# Patient Record
Sex: FEMALE | Race: BLACK OR AFRICAN AMERICAN | NOT HISPANIC OR LATINO | Employment: FULL TIME | URBAN - METROPOLITAN AREA
[De-identification: names, ages, dates, MRNs, and addresses within clinical notes are randomized per-mention and may not be internally consistent; named-entity substitution may affect disease eponyms.]

---

## 2017-02-10 ENCOUNTER — HOSPITAL ENCOUNTER (EMERGENCY)
Facility: CLINIC | Age: 27
Discharge: HOME | End: 2017-02-11
Attending: EMERGENCY MEDICINE | Admitting: EMERGENCY MEDICINE
Payer: COMMERCIAL

## 2017-02-10 DIAGNOSIS — N93.9 VAGINAL BLEEDING: ICD-10-CM

## 2017-02-10 PROCEDURE — 99284 EMERGENCY DEPT VISIT MOD MDM: CPT | Mod: 25

## 2017-02-10 NOTE — ED AVS SNAPSHOT
Emergency Department    [address] [facility] [place] [address]    Phone:  [phone]    Fax:  [phone]                                       Malini Stanley   MRN: [id number]    Department:   Emergency Department   Date of Visit:  2/10/2017           After Visit Summary Signature Page     I have received my discharge instructions, and my questions have been answered. I have discussed any challenges I see with this plan with the nurse or doctor.    ..........................................................................................................................................  Patient/Patient Representative Signature      ..........................................................................................................................................  Patient Representative Print Name and Relationship to Patient    ..................................................               ................................................  Date                                            Time    ..........................................................................................................................................  Reviewed by Signature/Title    ...................................................              ..............................................  Date                                                            Time

## 2017-02-10 NOTE — ED AVS SNAPSHOT
Emergency Department    [address] [facility] [place] [address]    Phone:  [phone]    Fax:  [phone]                                       Malini Stanley   MRN: [id number]    Department:   Emergency Department   Date of Visit:  2/10/2017           Patient Information     Date Of Birth          1990        Your diagnoses for this visit were:     Vaginal bleeding        You were seen by Nils Pires MD.      Follow-up Information     Follow up with Clinic, [facility]. Schedule an appointment as soon as possible for a visit in 1 week.    Contact information:    [employer], [address]  [facility]  [phone]          Follow up with  Emergency Department.    Specialty:  EMERGENCY MEDICINE    Why:  If symptoms worsen    Contact information:    [address] [facility] [address] [phone]        Discharge Instructions         Dysfunctional Uterine Bleeding    Dysfunctional uterine bleeding is a condition in which bleeding is abnormal and occurs at unexpected times of the month. This happens due to changes in the hormones that help control a woman s menstrual cycle each month.  The bleeding may be heavier or lighter than normal. If you have heavy bleeding often, this can lead to a problem called anemia. With anemia, your red blood cell count is too low. Red blood cells are needed because they help carry oxygen throughout your body. Severe anemia may cause you to look pale and feel very weak or tired. You might also become short of breath easily.  To treat dysfunctional uterine bleeding, medicines are often tried first. If these don t help, further testing and treatments may be needed. Discuss all of your options with your provider.  Home care  Medicines  If you re prescribed medicines, be sure to take them as directed. Some of the more common medicines you may be prescribed include:    Hormone therapy (Options include most methods of hormonal  birth control such as pills, shots, or a hormone-releasing IUD)    Nonsteroidal anti-inflammatory drugs (NSAIDs), such as ibuprofen    Iron supplements, if you have anemia     General care    Get plenty of rest if you tire easily. Avoid heavy exertion.    To help relieve pain or cramping that may occur with bleeding, try using a heating pad on the lower belly or back. A warm bath may also help.  Follow-up care  Follow up with your healthcare provider as directed.  When to seek medical advice  Call your healthcare provider right away if:    Bleeding becomes heavy (soaking 1 pad or tampon every hour for 3 hours)    Increased abdominal pain    Irregular bleeding worsens or does not get better even with treatment    Fever of 100.4 F (38 C) or higher, or as directed by your provider    Signs of anemia, such as pale skin, extreme fatigue or weakness, or shortness of breath    Dizziness or fainting       9866-6516 [employer]. [address] [place] [address]. All rights reserved. This information is not intended as a substitute for professional medical care. Always follow your healthcare professional's instructions.          24 Hour Appointment Hotline       To make an appointment at any [facility] (formerly [facility]3], call [phone] ([phone]). If you don't have a family doctor or clinic, we will help you find one. [facility] clinics are conveniently located to serve the needs of you and your family.             Review of your medicines      START taking        Dose / Directions Last dose taken    levonorgestrel-ethinyl estradiol 0.1-20 MG-MCG per tablet   Commonly known as:  DELBERT NOBLES LESSINA   Dose:  1 tablet   Quantity:  28 tablet        Take 1 tablet by mouth daily   Refills:  0        ondansetron 4 MG ODT tab   Commonly known as:  ZOFRAN ODT   Dose:  4 mg   Quantity:  10 tablet        Take 1 tablet (4 mg) by mouth every 8 hours as needed for nausea   Refills:  0                Prescriptions were  sent or printed at these locations (2 Prescriptions)                   Other Prescriptions                Printed at Department/Unit printer (2 of 2)         ondansetron (ZOFRAN ODT) 4 MG ODT tab               levonorgestrel-ethinyl estradiol (AVIANE,ALESSE,LESSINA) 0.1-20 MG-MCG per tablet                Procedures and tests performed during your visit     CBC with platelets + differential    Chlamydia trachomatis PCR    HCG QUALitative pregnancy (blood)    INR    Neisseria gonorrhoeae PCR    Orthostatic blood pressure and pulse    PTT    US Pelvic Complete with Transvaginal    Wet prep      Orders Needing Specimen Collection     None      Pending Results     Date and Time Order Name Status Description    2/11/2017 0103 US Pelvic Complete with Transvaginal Preliminary     2/11/2017 0009 Neisseria gonorrhoeae PCR In process     2/11/2017 0009 Chlamydia trachomatis PCR In process             Pending Culture Results     Date and Time Order Name Status Description    2/11/2017 0009 Neisseria gonorrhoeae PCR In process     2/11/2017 0009 Chlamydia trachomatis PCR In process        Test Results from your hospital stay           2/11/2017 12:42 AM - Interface, Flexilab Results      Component Results     Component Value Ref Range & Units Status    WBC 9.6 4.0 - 11.0 10e9/L Final    RBC Count 4.73 3.8 - 5.2 10e12/L Final    Hemoglobin 12.1 11.7 - 15.7 g/dL Final    Hematocrit 37.0 35.0 - 47.0 % Final    MCV 78 78 - 100 fl Final    MCH 25.6 (L) 26.5 - 33.0 pg Final    MCHC 32.7 31.5 - 36.5 g/dL Final    RDW 14.9 10.0 - 15.0 % Final    Platelet Count 300 150 - 450 10e9/L Final    Diff Method Automated Method  Final    % Neutrophils 66.3 % Final    % Lymphocytes 26.3 % Final    % Monocytes 5.4 % Final    % Eosinophils 1.4 % Final    % Basophils 0.3 % Final    % Immature Granulocytes 0.3 % Final    Absolute Neutrophil 6.4 1.6 - 8.3 10e9/L Final    Absolute Lymphocytes 2.5 0.8 - 5.3 10e9/L Final    Absolute Monocytes 0.5 0.0 -  1.3 10e9/L Final    Absolute Eosinophils 0.1 0.0 - 0.7 10e9/L Final    Absolute Basophils 0.0 0.0 - 0.2 10e9/L Final    Abs Immature Granulocytes 0.0 0 - 0.4 10e9/L Final         2/11/2017  1:01 AM - Interface, Flexilab Results      Component Results     Component Value Ref Range & Units Status    HCG Qualitative Serum Negative NEG Final         2/11/2017 12:53 AM - Interface, Flexilab Results      Component Results     Component Value Ref Range & Units Status    INR 1.10 0.86 - 1.14 Final         2/11/2017 12:53 AM - Interface, Flexilab Results      Component Results     Component Value Ref Range & Units Status    PTT 32 22 - 37 sec Final         2/11/2017  1:10 AM - Interface, Flexilab Results      Component Results     Component    Specimen Description    Cervix    Wet Prep    No Trichomonas seen  No yeast seen  No clue cells seen  No PMNs seen      Micro Report Status    FINAL 02/11/2017 2/11/2017  1:01 AM - Interface, Flexilab Results         2/11/2017  1:01 AM - Interface, Flexilab Results               2/11/2017  2:10 AM - Interface, Radiant Ib      Narrative     US PELVIC COMPLETE WITH TRANSVAGINAL  2/11/2017 1:48 AM      HISTORY: Vaginal bleeding.     COMPARISON: None.    FINDINGS: Transabdominal and transvaginal imaging was performed.  Transvaginal imaging was performed to better visualize the endometrium  and adnexa. The uterus is normal in size and position measuring 7.2 x  4.0 x 4.1 cm. The endometrium is normal in thickness at 0.9 cm. There  are several nabothian cysts in the cervix. The left ovary is normal in  size and appearance. The right ovary is not seen. No adnexal mass. No  free pelvic fluid.        Impression     IMPRESSION: Normal pelvis. The right ovary is not seen.                Clinical Quality Measure: Blood Pressure Screening     Your blood pressure was checked while you were in the emergency department today. The last reading we obtained was  BP: 117/55 mmHg . Please read the  "guidelines below about what these numbers mean and what you should do about them.  If your systolic blood pressure (the top number) is less than 120 and your diastolic blood pressure (the bottom number) is less than 80, then your blood pressure is normal. There is nothing more that you need to do about it.  If your systolic blood pressure (the top number) is 120-139 or your diastolic blood pressure (the bottom number) is 80-89, your blood pressure may be higher than it should be. You should have your blood pressure rechecked within a year by a primary care provider.  If your systolic blood pressure (the top number) is 140 or greater or your diastolic blood pressure (the bottom number) is 90 or greater, you may have high blood pressure. High blood pressure is treatable, but if left untreated over time it can put you at risk for heart attack, stroke, or kidney failure. You should have your blood pressure rechecked by a primary care provider within the next 4 weeks.  If your provider in the emergency department today gave you specific instructions to follow-up with your doctor or provider even sooner than that, you should follow that instruction and not wait for up to 4 weeks for your follow-up visit.        Thank you for choosing [facility]       Thank you for choosing [facility] for your care. Our goal is always to provide you with excellent care. Hearing back from our patients is one way we can continue to improve our services. Please take a few minutes to complete the written survey that you may receive in the mail after you visit with us. Thank you!        [employer] Information     [employer] lets you send messages to your doctor, view your test results, renew your prescriptions, schedule appointments and more. To sign up, go to [email or web address] . Click on \"Log in\" on the left side of the screen, which will take you to the Welcome page. Then click on \"Sign up Now\" on the right side of the page.     You will be " asked to enter the access code listed below, as well as some personal information. Please follow the directions to create your username and password.     Your access code is: [email or web address]  Expires: 2017  3:00 AM     Your access code will  in 90 days. If you need help or a new code, please call your [facility] clinic or [phone].        Care EveryWhere ID     This is your Care EveryWhere ID. This could be used by other organizations to access your [facility] medical records  [id number]        After Visit Summary       This is your record. Keep this with you and show to your community pharmacist(s) and doctor(s) at your next visit.

## 2017-02-11 ENCOUNTER — APPOINTMENT (OUTPATIENT)
Dept: ULTRASOUND IMAGING | Facility: CLINIC | Age: 27
End: 2017-02-11
Attending: EMERGENCY MEDICINE
Payer: COMMERCIAL

## 2017-02-11 VITALS
DIASTOLIC BLOOD PRESSURE: 81 MMHG | BODY MASS INDEX: 50 KG/M2 | WEIGHT: 248 LBS | HEART RATE: 74 BPM | OXYGEN SATURATION: 100 % | RESPIRATION RATE: 16 BRPM | TEMPERATURE: 98.8 F | HEIGHT: 59 IN | SYSTOLIC BLOOD PRESSURE: 109 MMHG

## 2017-02-11 LAB
APTT PPP: 32 SEC (ref 22–37)
BASOPHILS # BLD AUTO: 0 10E9/L (ref 0–0.2)
BASOPHILS NFR BLD AUTO: 0.3 %
DIFFERENTIAL METHOD BLD: ABNORMAL
EOSINOPHIL # BLD AUTO: 0.1 10E9/L (ref 0–0.7)
EOSINOPHIL NFR BLD AUTO: 1.4 %
ERYTHROCYTE [DISTWIDTH] IN BLOOD BY AUTOMATED COUNT: 14.9 % (ref 10–15)
HCG SERPL QL: NEGATIVE
HCT VFR BLD AUTO: 37 % (ref 35–47)
HGB BLD-MCNC: 12.1 G/DL (ref 11.7–15.7)
IMM GRANULOCYTES # BLD: 0 10E9/L (ref 0–0.4)
IMM GRANULOCYTES NFR BLD: 0.3 %
INR PPP: 1.1 (ref 0.86–1.14)
LYMPHOCYTES # BLD AUTO: 2.5 10E9/L (ref 0.8–5.3)
LYMPHOCYTES NFR BLD AUTO: 26.3 %
MCH RBC QN AUTO: 25.6 PG (ref 26.5–33)
MCHC RBC AUTO-ENTMCNC: 32.7 G/DL (ref 31.5–36.5)
MCV RBC AUTO: 78 FL (ref 78–100)
MICRO REPORT STATUS: NORMAL
MONOCYTES # BLD AUTO: 0.5 10E9/L (ref 0–1.3)
MONOCYTES NFR BLD AUTO: 5.4 %
NEUTROPHILS # BLD AUTO: 6.4 10E9/L (ref 1.6–8.3)
NEUTROPHILS NFR BLD AUTO: 66.3 %
PLATELET # BLD AUTO: 300 10E9/L (ref 150–450)
RBC # BLD AUTO: 4.73 10E12/L (ref 3.8–5.2)
SPECIMEN SOURCE: NORMAL
WBC # BLD AUTO: 9.6 10E9/L (ref 4–11)
WET PREP SPEC: NORMAL

## 2017-02-11 PROCEDURE — 85025 COMPLETE CBC W/AUTO DIFF WBC: CPT | Performed by: EMERGENCY MEDICINE

## 2017-02-11 PROCEDURE — 76856 US EXAM PELVIC COMPLETE: CPT

## 2017-02-11 PROCEDURE — 76830 TRANSVAGINAL US NON-OB: CPT

## 2017-02-11 PROCEDURE — 85730 THROMBOPLASTIN TIME PARTIAL: CPT | Performed by: EMERGENCY MEDICINE

## 2017-02-11 PROCEDURE — 84703 CHORIONIC GONADOTROPIN ASSAY: CPT | Performed by: EMERGENCY MEDICINE

## 2017-02-11 PROCEDURE — 87210 SMEAR WET MOUNT SALINE/INK: CPT | Performed by: EMERGENCY MEDICINE

## 2017-02-11 PROCEDURE — 85610 PROTHROMBIN TIME: CPT | Performed by: EMERGENCY MEDICINE

## 2017-02-11 PROCEDURE — 87491 CHLMYD TRACH DNA AMP PROBE: CPT | Performed by: EMERGENCY MEDICINE

## 2017-02-11 PROCEDURE — 87591 N.GONORRHOEAE DNA AMP PROB: CPT | Performed by: EMERGENCY MEDICINE

## 2017-02-11 RX ORDER — LEVONORGESTREL/ETHIN.ESTRADIOL 0.1-0.02MG
1 TABLET ORAL DAILY
Qty: 28 TABLET | Refills: 0 | Status: SHIPPED | OUTPATIENT
Start: 2017-02-11

## 2017-02-11 RX ORDER — ONDANSETRON 4 MG/1
4 TABLET, ORALLY DISINTEGRATING ORAL EVERY 8 HOURS PRN
Qty: 10 TABLET | Refills: 0 | Status: SHIPPED | OUTPATIENT
Start: 2017-02-11 | End: 2017-02-14

## 2017-02-11 ASSESSMENT — ENCOUNTER SYMPTOMS
LIGHT-HEADEDNESS: 0
BRUISES/BLEEDS EASILY: 0

## 2017-02-11 NOTE — ED NOTES
Patient back from radiology.  Mayra DARLING,.......................................... 2/11/2017   1:55 AM

## 2017-02-11 NOTE — DISCHARGE INSTRUCTIONS
Dysfunctional Uterine Bleeding    Dysfunctional uterine bleeding is a condition in which bleeding is abnormal and occurs at unexpected times of the month. This happens due to changes in the hormones that help control a woman s menstrual cycle each month.  The bleeding may be heavier or lighter than normal. If you have heavy bleeding often, this can lead to a problem called anemia. With anemia, your red blood cell count is too low. Red blood cells are needed because they help carry oxygen throughout your body. Severe anemia may cause you to look pale and feel very weak or tired. You might also become short of breath easily.  To treat dysfunctional uterine bleeding, medicines are often tried first. If these don t help, further testing and treatments may be needed. Discuss all of your options with your provider.  Home care  Medicines  If you re prescribed medicines, be sure to take them as directed. Some of the more common medicines you may be prescribed include:    Hormone therapy (Options include most methods of hormonal birth control such as pills, shots, or a hormone-releasing IUD)    Nonsteroidal anti-inflammatory drugs (NSAIDs), such as ibuprofen    Iron supplements, if you have anemia     General care    Get plenty of rest if you tire easily. Avoid heavy exertion.    To help relieve pain or cramping that may occur with bleeding, try using a heating pad on the lower belly or back. A warm bath may also help.  Follow-up care  Follow up with your healthcare provider as directed.  When to seek medical advice  Call your healthcare provider right away if:    Bleeding becomes heavy (soaking 1 pad or tampon every hour for 3 hours)    Increased abdominal pain    Irregular bleeding worsens or does not get better even with treatment    Fever of 100.4 F (38 C) or higher, or as directed by your provider    Signs of anemia, such as pale skin, extreme fatigue or weakness, or shortness of breath    Dizziness or fainting        7893-0950 [employer]. [address] [place] [address], [place] [address]. All rights reserved. This information is not intended as a substitute for professional medical care. Always follow your healthcare professional's instructions.

## 2017-02-11 NOTE — ED PROVIDER NOTES
"  History     Chief Complaint:  Vaginal Bleeding      The history is provided by the patient.      Malini Stanley is a 26 year old healthy at baseline female who presents with vaginal bleeding. The patient states that she had her period one month ago and noted that the bleeding stopped for several days but then returned. She reports that she has since had daily spotting and bleeding and has been using 2-3 pads per day. The patient is sexually active and states that she is not currently on birth control. She has not taken a pregnancy test. The patient denies any concern for STIs. She additionally denies any pelvic pain, light headedness, vaginal discharge or blood thinner use. She has not had other bruising or bleeding.        Allergies:  No known drug allergies.      Medications:    The patient is currently on no regular medications.       Past Medical History:    History reviewed.  No significant past medical history.       Past Surgical History:    History reviewed. No pertinent past surgical history.      Family History:    History reviewed. No pertinent family history.  No blood clots or bleeding problems. No Gyn problems.    Social History:  Marital Status: Single  Presents to the ED alone  Tobacco Use: Current daily smoker, 0.50 PPD.   Alcohol Use: Yes  PCP: [facility]      Review of Systems   Genitourinary: Positive for vaginal bleeding. Negative for vaginal discharge and pelvic pain.   Neurological: Negative for light-headedness.   Hematological: Does not bruise/bleed easily.   All other systems reviewed and are negative.    Physical Exam   First Vitals:  BP: 136/82 mmHg  Heart Rate: 87  Temp: 98.8  F (37.1  C)  Resp: 16  Height: 149.9 cm (4' 11\")  Weight: 112.492 kg (248 lb) (scale out front used)  SpO2: 100 %    Physical Exam  Constitutional:  Appears well-developed and well-nourished. Cooperative.   HENT:   Head:    Atraumatic.   Mouth/Throat:   Oropharynx is without " erythema or exudate and mucous     membranes are moist.   Eyes:    Conjunctivae normal and EOM are normal.      Pupils are equal, round, and reactive to light.      Left eye exotropia  Neck:    Normal range of motion. Neck supple.   Cardiovascular:  Normal rate, regular rhythm, normal heart sounds and radial and    dorsalis pedis pulses are 2+ and symmetric.    Pulmonary/Chest:  Effort normal and breath sounds normal.   Abdominal:   Soft. Bowel sounds are normal.      No splenomegaly or hepatomegaly. No tenderness. No rebound.   Pelvic Exam:   Normal appearing external vaginal genitalia. There is a moderate amount of dark red blood in the vaginal vault. No clots. No abnormal discharge. No inflammatory changes of the cervix. No cervical motion tenderness. No midline uterine or adnexal tenderness. No masses.   Musculoskeletal:  Normal range of motion. No edema and no tenderness.   Neurological:  Alert. Normal strength. No cranial nerve deficit.   Skin:    Skin is warm and dry.   Psychiatric:   Normal mood and affect.      Emergency Department Course   Imaging:  US Pelvic complete with transvaginal:   Normal pelvis. The right ovary is not seen.  Preliminary radiology read.     Radiographic findings were communicated with the patient who voiced understanding of the findings.    Laboratory:  Wet Prep: No PMN's seen.  No Trichomonas seen.  No clue cells seen. No yeast seen.  Chlamydia trachomatis PCR: pending  N. Gonorrhea PCR: pending     CBC:  WBC 9.6, HGB 12.1,   HCG: Negative  INR: 1.10  PTT: 32    Emergency Department Course:  Nursing notes and vitals reviewed.  I performed an exam of the patient as documented above.   A peripheral IV was established. Blood was drawn from the patient. This was sent for laboratory testing, findings above.    I performed a pelvic exam with female chaperone.  Wet prep and Chlamydia/Gonorrhea samples were obtained and sent to the lab, results as above.   The patient was sent for a  US Pelvic complete with transvaginal while in the emergency department, findings above.    Findings and plan explained to the patient. Patient discharged home with instructions regarding supportive care, medications, and reasons to return. The importance of close follow-up was reviewed. The patient was prescribed Levonorgestrel-ethinyl estradiol and Zofran.      Impression & Plan    Medical Decision Making:  Malini Stanley is a 26 year old female who presents for evaluation of painless vaginal bleeding.  The differential diagnosis of vaginal bleeding is broad and includes uterine leiomyomas, endometrial polyp, adenomyosis, bleeding disorder, hypothyroidism, primary dysmenorrhea.  More rare but serious etiologies considered included ectopic pregnancy, pregnancy, heterotopic pregnancy, etc.  In this patient, there are no signs of serious etiologies of vaginal bleeding.  Supportive outpatient management is therefore indicated.  Plan is home, close follow-up with OB, and return to ED for worsening pain, heavy vaginal bleeding (more than 1 pad soaked every hour).  Will start the patient on oral contraceptives. She is a smoker, but does not have other risk factors for DVT. We discussed the risk of clotting, and the patient wishes to start the OCPs to control bleeding while she awaits OB/GYN follow up. Questions were answered.       Diagnosis:    ICD-10-CM    1. Vaginal bleeding N93.9        Disposition:  discharged to home    Discharge Medications:  New Prescriptions    LEVONORGESTREL-ETHINYL ESTRADIOL (AVIANE,ALESSE,LESSINA) 0.1-20 MG-MCG PER TABLET    Take 1 tablet by mouth daily    ONDANSETRON (ZOFRAN ODT) 4 MG ODT TAB    Take 1 tablet (4 mg) by mouth every 8 hours as needed for nausea         Aleisha LARKIN, am serving as a scribe on 2/11/2017 at 12:03 AM to personally document services performed by Dr. Pires based on my observations and the provider's statements to me.    2/10/2017    EMERGENCY  DEPARTMENT        Isely, Nils Conn MD  02/11/17 [id number]

## 2017-02-11 NOTE — ED NOTES
Assisted MD with pelvic exam, specimens collected and sent to lab for analysis.  Mayra DARLING,.......................................... 2/11/2017   1:02 AM

## 2017-02-11 NOTE — ED NOTES
Obtained orthostatics performed. No c/o dizziness.  Mayra DARLING,.......................................... 2/11/2017   1:01 AM

## 2017-02-12 ENCOUNTER — TELEPHONE (OUTPATIENT)
Dept: EMERGENCY MEDICINE | Facility: CLINIC | Age: 27
End: 2017-02-12

## 2017-02-12 LAB
C TRACH DNA SPEC QL NAA+PROBE: NORMAL
N GONORRHOEA DNA SPEC QL NAA+PROBE: ABNORMAL
SPECIMEN SOURCE: ABNORMAL
SPECIMEN SOURCE: NORMAL

## 2017-02-12 RX ORDER — AZITHROMYCIN 500 MG/1
1000 TABLET, FILM COATED ORAL ONCE
Qty: 2 TABLET | Refills: 0 | Status: SHIPPED | OUTPATIENT
Start: 2017-02-12 | End: 2017-02-12

## 2017-02-12 RX ORDER — ONDANSETRON 4 MG/1
4 TABLET, ORALLY DISINTEGRATING ORAL ONCE
Qty: 1 TABLET | Refills: 0 | Status: SHIPPED
Start: 2017-02-12 | End: 2017-02-12

## 2017-02-12 NOTE — TELEPHONE ENCOUNTER
[facility] Emergency Department Lab result notification [Adult-Female]    [facility] ED lab result protocol used  Gonorrhea protocol    Reason for call  Notify of lab results, assess symptoms,  review ED providers recommendations/discharge instructions (if necessary) and advise per ED lab result f/u protocol    Lab Result (including Rx patient on, if applicable)  Final N. Gonorrhoeae PCR is POSITIVE.   Patient was treated appropriately in the ED [Yes or No]:  no       If Yes, list what was given in the ED:  none  If no treatment initiated in the [place] ED, treat per [place] [facility] Lab Result protocol.  Information table from ED Provider visit on 02/10/2017  ED diagnosis Vaginal bleeding   ED provider Nils Pries MD   Symptoms reported at ED visit (Chief complaint, HPI) a 26 year old healthy at baseline female who presents with vaginal bleeding. The patient states that she had her period one month ago and noted that the bleeding stopped for several days but then returned. She reports that she has since had daily spotting and bleeding and has been using 2-3 pads per day. The patient is sexually active and states that she is not currently on birth control. She has not taken a pregnancy test. The patient denies any concern for STIs. She additionally denies any pelvic pain, light headedness, vaginal discharge or blood thinner use. She has not had other bruising or bleeding.    ED providers Impression and Plan (applicable information) a 26 year old female who presents for evaluation of painless vaginal bleeding. The differential diagnosis of vaginal bleeding is broad and includes uterine leiomyomas, endometrial polyp, adenomyosis, bleeding disorder, hypothyroidism, primary dysmenorrhea. More rare but serious etiologies considered included ectopic pregnancy, pregnancy, heterotopic pregnancy, etc. In this patient, there are no signs of serious etiologies of vaginal bleeding. Supportive outpatient management is  therefore indicated. Plan is home, close follow-up with OB, and return to ED for worsening pain, heavy vaginal bleeding (more than 1 pad soaked every hour). Will start the patient on oral contraceptives. She is a smoker, but does not have other risk factors for DVT. We discussed the risk of clotting, and the patient wishes to start the OCPs to control bleeding while she awaits OB/GYN follow up. Questions were answered.    Significant Medical hx, if applicable reviewed   Coumadin/Warfarin [Yes /No] no   Creatinine Level (mg/dl) unknown   Creatinine clearance (ml/min), if applicable unknown   Pregnant (Yes/No/NA) No   Breastfeeding (Yes/No/NA) No   Allergies NKA   Weight, if applicable 112.5 kg      RN Assessment (Patient s current Symptoms), include time called.  [Insert Left message here if message left]  At 1611 still has vaginal bleeding no worse than when seen.     RN Recommendations/Instructions per [place] ED lab result protocol  Patient notified of lab result and treatment recommendations.  Rx for Suprax and azithromycin sent to [Pharmacy - [facility]].  RN reviewed information about        STD Patient Instructions:  We recommend that you contact any recent sexual partners within the last 2 months and have them evaluated by a physician.  Avoid sexual activity for 7 to 10 days or until both your and your partner(s) have completed all antibiotic medications.    We advise that you consider following up with your PCP at approximately 3 months for retesting to be sure the infection has cleared.    Barbara Palafox RN  [facility] RN  Lung Nodule and ED Lab Result F/u RN  Epic pool (ED late result f/u RN): [id number]  FV INCIDENTAL RADIOLOGY F/U NURSES: P [id number]  # [phone]    Copy of Lab result   Exam Information   Exam Date Exam Time Accession # Results    2/11/17 12:46 AM [id number]    Component Results   Component Value Flag Ref Range Units Status Collected Lab   Specimen Descrip Cervix    Final  02/11/2017 12:46 AM FrStLb   N Gonorrhea PCR  (A) NEG  Corrected 02/11/2017 12:46 AM 75   Positive   Positive for N. gonorrhoeae rRNA by transcription mediated amplification.    As is true for all non-culture methods, a positive specimen obtained from a    patient after therapeutic treatment cannot be interpreted as indicating the    presence of viable N. gonorrhoeae.    Critical Value/Significant Value called to and read back by   [place] FOLLOW UP .[phone] AT 1345 2.12.17 MW.   CORRECTED ON 02/12 AT 1349: PREVIOUSLY REPORTED AS Positive

## 2017-02-21 ENCOUNTER — TRANSFERRED RECORDS (OUTPATIENT)
Dept: HEALTH INFORMATION MANAGEMENT | Facility: CLINIC | Age: 27
End: 2017-02-21

## 2017-02-21 ENCOUNTER — TELEPHONE (OUTPATIENT)
Dept: INFUSION THERAPY | Facility: CLINIC | Age: 27
End: 2017-02-21

## 2017-02-21 DIAGNOSIS — A54.9 GONORRHEA: Primary | ICD-10-CM

## 2017-02-21 RX ORDER — CEFTRIAXONE SODIUM 1 G
250 VIAL (EA) INJECTION ONCE
Status: CANCELLED
Start: 2017-02-21 | End: 2017-02-21

## 2017-02-21 NOTE — TELEPHONE ENCOUNTER
Left patient a message to reschedule infusion appt that was missed today at 2:30pm. Left main office and both scheduling numbers on voice mail.

## 2017-02-24 ENCOUNTER — INFUSION THERAPY VISIT (OUTPATIENT)
Dept: INFUSION THERAPY | Facility: CLINIC | Age: 27
End: 2017-02-24
Attending: INTERNAL MEDICINE
Payer: COMMERCIAL

## 2017-02-24 VITALS
RESPIRATION RATE: 18 BRPM | DIASTOLIC BLOOD PRESSURE: 68 MMHG | SYSTOLIC BLOOD PRESSURE: 121 MMHG | HEART RATE: 60 BPM | TEMPERATURE: 98 F

## 2017-02-24 DIAGNOSIS — A54.9 GONORRHEA: Primary | ICD-10-CM

## 2017-02-24 PROCEDURE — 96372 THER/PROPH/DIAG INJ SC/IM: CPT

## 2017-02-24 PROCEDURE — 25000128 H RX IP 250 OP 636: Performed by: OBSTETRICS & GYNECOLOGY

## 2017-02-24 RX ORDER — CEFTRIAXONE SODIUM 1 G
250 VIAL (EA) INJECTION ONCE
Status: CANCELLED
Start: 2017-02-24 | End: 2017-02-24

## 2017-02-24 RX ORDER — CEFTRIAXONE SODIUM 1 G
250 VIAL (EA) INJECTION ONCE
Status: COMPLETED | OUTPATIENT
Start: 2017-02-24 | End: 2017-02-24

## 2017-02-24 RX ADMIN — CEFTRIAXONE 250 MG: 1 INJECTION, POWDER, FOR SOLUTION INTRAMUSCULAR; INTRAVENOUS at 13:33

## 2017-02-24 ASSESSMENT — PAIN SCALES - GENERAL: PAINLEVEL: NO PAIN (0)

## 2017-02-24 NOTE — MR AVS SNAPSHOT
"              After Visit Summary   2017    Malini Stanley    MRN: [id number]           Patient Information     Date Of Birth          1990        Visit Information        Provider Department      2017 12:30 PM [place] [facility]IR [facility]        Today's Diagnoses     Gonorrhea    -  1       Follow-ups after your visit        Who to contact     If you have questions or need follow up information about today's clinic visit or your schedule please contact [facility] directly at [phone].  Normal or non-critical lab and imaging results will be communicated to you by [employer], letter or phone within 4 business days after the clinic has received the results. If you do not hear from us within 7 days, please contact the clinic through [employer] or phone. If you have a critical or abnormal lab result, we will notify you by phone as soon as possible.  Submit refill requests through [employer] or call your pharmacy and they will forward the refill request to us. Please allow 3 business days for your refill to be completed.          Additional Information About Your Visit        MyChart Information     [employer] lets you send messages to your doctor, view your test results, renew your prescriptions, schedule appointments and more. To sign up, go to [email or web address] . Click on \"Log in\" on the left side of the screen, which will take you to the Welcome page. Then click on \"Sign up Now\" on the right side of the page.     You will be asked to enter the access code listed below, as well as some personal information. Please follow the directions to create your username and password.     Your access code is: [email or web address]  Expires: 2017  3:00 AM     Your access code will  in 90 days. If you need help or a new code, please call your [facility] or [phone].        Care EveryWhere ID     This is your Care EveryWhere ID. This could " be used by other organizations to access your [place] medical records  [id number]        Your Vitals Were     Pulse Temperature Respirations             60 98  F (36.7  C) (Oral) 18          Blood Pressure from Last 3 Encounters:   02/24/17 121/68   02/11/17 109/81   08/18/16 142/78    Weight from Last 3 Encounters:   02/10/17 112.5 kg (248 lb)   08/18/16 104.3 kg (230 lb)              Today, you had the following     No orders found for display       Primary Care Provider Office Phone # Fax #    [facility] [phone]       [facility] [address]        Thank you!     Thank you for choosing [facility]  for your care. Our goal is always to provide you with excellent care. Hearing back from our patients is one way we can continue to improve our services. Please take a few minutes to complete the written survey that you may receive in the mail after your visit with us. Thank you!             Your Updated Medication List - Protect others around you: Learn how to safely use, store and throw away your medicines at www.disposemymeds.org.          This list is accurate as of: 2/24/17  1:43 PM.  Always use your most recent med list.                   Brand Name Dispense Instructions for use    levonorgestrel-ethinyl estradiol 0.1-20 MG-MCG per tablet    DELBERT NOBLES LESSINA    28 tablet    Take 1 tablet by mouth daily

## 2017-02-24 NOTE — PROGRESS NOTES
Infusion Nursing Note:  Malini Stanley presents today for rocephin.    Patient seen by provider today: No   present during visit today: Not Applicable.    Note: N/A.    Intravenous Access:  No Intravenous access/labs at this visit.    Treatment Conditions:  Not Applicable.      Post Infusion Assessment:  Patient tolerated injection without incident.  Site patent and intact, free from redness, edema or discomfort.  No evidence of extravasations.    Discharge Plan:   Patient declined prescription refills.  Discharge instructions reviewed with: Patient.  Patient and/or family verbalized understanding of discharge instructions and all questions answered.  Copy of AVS reviewed with patient and/or family.  Patient will return prn for next appointment.  Patient discharged in stable condition accompanied by: self.  Departure Mode: Ambulatory.    MAMADOU Briones RN

## 2017-03-13 NOTE — ED NOTES
Chart accessed by request of Stephy Rizvi from the [facility].  They have been unable to reach this patient and they are requesting alternative phone numbers.  Emergency contact name and phone number provided.

## 2024-08-25 ENCOUNTER — HOSPITAL ENCOUNTER (EMERGENCY)
Facility: CLINIC | Age: 34
Discharge: HOME | End: 2024-08-25
Attending: EMERGENCY MEDICINE | Admitting: EMERGENCY MEDICINE
Payer: COMMERCIAL

## 2024-08-25 ENCOUNTER — APPOINTMENT (OUTPATIENT)
Dept: CT IMAGING | Facility: CLINIC | Age: 34
End: 2024-08-25
Attending: EMERGENCY MEDICINE
Payer: COMMERCIAL

## 2024-08-25 VITALS
BODY MASS INDEX: 50.09 KG/M2 | SYSTOLIC BLOOD PRESSURE: 160 MMHG | RESPIRATION RATE: 16 BRPM | OXYGEN SATURATION: 97 % | TEMPERATURE: 99.2 F | HEIGHT: 59 IN | DIASTOLIC BLOOD PRESSURE: 91 MMHG | HEART RATE: 92 BPM

## 2024-08-25 DIAGNOSIS — K13.79 INFECTION OF BUCCAL SPACE: ICD-10-CM

## 2024-08-25 LAB
ANION GAP SERPL CALCULATED.3IONS-SCNC: 5 MMOL/L (ref 7–15)
BASOPHILS # BLD AUTO: 0 10E3/UL (ref 0–0.2)
BASOPHILS NFR BLD AUTO: 0 %
BUN SERPL-MCNC: 8.8 MG/DL (ref 6–20)
CALCIUM SERPL-MCNC: 9.4 MG/DL (ref 8.8–10.4)
CHLORIDE SERPL-SCNC: 100 MMOL/L (ref 98–107)
CREAT SERPL-MCNC: 0.64 MG/DL (ref 0.51–0.95)
EGFRCR SERPLBLD CKD-EPI 2021: >90 ML/MIN/1.73M2
ELLIPTOCYTES BLD QL SMEAR: SLIGHT
EOSINOPHIL # BLD AUTO: 0.2 10E3/UL (ref 0–0.7)
EOSINOPHIL NFR BLD AUTO: 2 %
ERYTHROCYTE [DISTWIDTH] IN BLOOD BY AUTOMATED COUNT: 17.5 % (ref 10–15)
GLUCOSE SERPL-MCNC: 200 MG/DL (ref 70–99)
HCO3 SERPL-SCNC: 30 MMOL/L (ref 22–29)
HCT VFR BLD AUTO: 33.4 % (ref 35–47)
HGB BLD-MCNC: 9.6 G/DL (ref 11.7–15.7)
IMM GRANULOCYTES # BLD: 0.1 10E3/UL
IMM GRANULOCYTES NFR BLD: 1 %
LYMPHOCYTES # BLD AUTO: 3.1 10E3/UL (ref 0.8–5.3)
LYMPHOCYTES NFR BLD AUTO: 25 %
MCH RBC QN AUTO: 19 PG (ref 26.5–33)
MCHC RBC AUTO-ENTMCNC: 28.7 G/DL (ref 31.5–36.5)
MCV RBC AUTO: 66 FL (ref 78–100)
MONOCYTES # BLD AUTO: 0.6 10E3/UL (ref 0–1.3)
MONOCYTES NFR BLD AUTO: 5 %
NEUTROPHILS # BLD AUTO: 8.1 10E3/UL (ref 1.6–8.3)
NEUTROPHILS NFR BLD AUTO: 67 %
NRBC # BLD AUTO: 0 10E3/UL
NRBC BLD AUTO-RTO: 0 /100
PLAT MORPH BLD: ABNORMAL
PLATELET # BLD AUTO: 385 10E3/UL (ref 150–450)
POTASSIUM SERPL-SCNC: 3.9 MMOL/L (ref 3.4–5.3)
RBC # BLD AUTO: 5.04 10E6/UL (ref 3.8–5.2)
RBC MORPH BLD: ABNORMAL
SODIUM SERPL-SCNC: 135 MMOL/L (ref 135–145)
WBC # BLD AUTO: 12.1 10E3/UL (ref 4–11)

## 2024-08-25 PROCEDURE — 96374 THER/PROPH/DIAG INJ IV PUSH: CPT | Mod: 59

## 2024-08-25 PROCEDURE — 70491 CT SOFT TISSUE NECK W/DYE: CPT

## 2024-08-25 PROCEDURE — 250N000009 HC RX 250: Performed by: EMERGENCY MEDICINE

## 2024-08-25 PROCEDURE — 36415 COLL VENOUS BLD VENIPUNCTURE: CPT | Performed by: EMERGENCY MEDICINE

## 2024-08-25 PROCEDURE — 80048 BASIC METABOLIC PNL TOTAL CA: CPT | Performed by: EMERGENCY MEDICINE

## 2024-08-25 PROCEDURE — 85025 COMPLETE CBC W/AUTO DIFF WBC: CPT | Performed by: EMERGENCY MEDICINE

## 2024-08-25 PROCEDURE — 99285 EMERGENCY DEPT VISIT HI MDM: CPT | Mod: 25

## 2024-08-25 PROCEDURE — 250N000011 HC RX IP 250 OP 636: Performed by: EMERGENCY MEDICINE

## 2024-08-25 PROCEDURE — 96375 TX/PRO/DX INJ NEW DRUG ADDON: CPT | Mod: 59

## 2024-08-25 RX ORDER — IOPAMIDOL 755 MG/ML
90 INJECTION, SOLUTION INTRAVASCULAR ONCE
Status: COMPLETED | OUTPATIENT
Start: 2024-08-25 | End: 2024-08-25

## 2024-08-25 RX ORDER — KETOROLAC TROMETHAMINE 15 MG/ML
15 INJECTION, SOLUTION INTRAMUSCULAR; INTRAVENOUS ONCE
Status: COMPLETED | OUTPATIENT
Start: 2024-08-25 | End: 2024-08-25

## 2024-08-25 RX ORDER — HYDROCODONE BITARTRATE AND ACETAMINOPHEN 5; 325 MG/1; MG/1
1 TABLET ORAL EVERY 6 HOURS PRN
Qty: 8 TABLET | Refills: 0 | Status: SHIPPED | OUTPATIENT
Start: 2024-08-25 | End: 2024-08-28

## 2024-08-25 RX ORDER — DEXAMETHASONE SODIUM PHOSPHATE 10 MG/ML
10 INJECTION, SOLUTION INTRAMUSCULAR; INTRAVENOUS ONCE
Status: COMPLETED | OUTPATIENT
Start: 2024-08-25 | End: 2024-08-25

## 2024-08-25 RX ADMIN — IOPAMIDOL 90 ML: 755 INJECTION, SOLUTION INTRAVENOUS at 05:53

## 2024-08-25 RX ADMIN — SODIUM CHLORIDE 60 ML: 9 INJECTION, SOLUTION INTRAVENOUS at 05:54

## 2024-08-25 RX ADMIN — DEXAMETHASONE SODIUM PHOSPHATE 10 MG: 10 INJECTION INTRAMUSCULAR; INTRAVENOUS at 05:14

## 2024-08-25 RX ADMIN — KETOROLAC TROMETHAMINE 15 MG: 15 INJECTION, SOLUTION INTRAMUSCULAR; INTRAVENOUS at 05:14

## 2024-08-25 ASSESSMENT — ACTIVITIES OF DAILY LIVING (ADL)
ADLS_ACUITY_SCORE: 35
ADLS_ACUITY_SCORE: 33

## 2024-08-25 ASSESSMENT — COLUMBIA-SUICIDE SEVERITY RATING SCALE - C-SSRS
2. HAVE YOU ACTUALLY HAD ANY THOUGHTS OF KILLING YOURSELF IN THE PAST MONTH?: NO
1. IN THE PAST MONTH, HAVE YOU WISHED YOU WERE DEAD OR WISHED YOU COULD GO TO SLEEP AND NOT WAKE UP?: NO
6. HAVE YOU EVER DONE ANYTHING, STARTED TO DO ANYTHING, OR PREPARED TO DO ANYTHING TO END YOUR LIFE?: NO

## 2024-08-25 NOTE — ED PROVIDER NOTES
I saw this patient at the end of my shift.  I performed a cursory history and physical and ordered blood work and imaging for the oncoming provider.  Please see Dr. Werner's note for complete information on this patient.     Trierweiler, Chad A, MD  08/25/24 3900

## 2024-08-25 NOTE — ED TRIAGE NOTES
Pt c/o left lower dental pain radiating into ear and head for past couple days.      Triage Assessment (Adult)       Row Name 08/25/24 0442          Respiratory WDL    Respiratory WDL WDL        Cardiac WDL    Cardiac WDL WDL        Cognitive/Neuro/Behavioral WDL    Cognitive/Neuro/Behavioral WDL WDL

## 2024-08-25 NOTE — ED PROVIDER NOTES
"  Emergency Department Note      History of Present Illness     Chief Complaint   Dental Pain    HPI   Malini Stanley is a 34 year old female who presents to the ED for evaluation of dental pain. The patient reports that on Friday she started to develop left lower dental pain. She states that she experiences the pain with chewing, yawning, and coughing. Patient does believe she might have bit the inside of her cheek. Denies fever.    Independent Historian   None    Review of External Notes   I reviewed ED note from Dr. Nunez on 11/22/22. Patient presented to the ED with a cough and was prescribed albuterol.    Past Medical History     Medical History and Problem List   Gonorrhea   Chorioamnionitis     Medications   The patient is not currently taking any prescribed medications.     Surgical History   The patient has no pertinent past surgical history.     Physical Exam     Patient Vitals for the past 24 hrs:   BP Temp Pulse Resp SpO2 Height   08/25/24 0441 (!) 160/91 99.2  F (37.3  C) 92 16 97 % 1.499 m (4' 11\")     Physical Exam  Nursing note and vitals reviewed.  Constitutional:  Alert.  Appears comfortable.   HENT:    Patient with some mild swelling over the left cheek but I do not see a bite shady inside the cheek.  She is tender through the left cheek back toward the TMJ.  She does have pain with opening the jaw.  No dental tenderness.  The ear is normal on the left and there is no significant pain over the mastoid or redness or swelling.  Eyes:    Conjunctivae are normal.  No proptosis.     Right eye exhibits no discharge. Left eye exhibits no discharge.   Cardiovascular:  Normal rate, regular rhythm.      Normal heart sounds.  Lymph:   She does have some tender lymphadenopathy with minimal swelling in the submandibular glands around the angle of the jaw on the left.  Neurological:   Alert and appropriate. No focal weakness.  Skin:    Skin is warm and dry. No rash noted. No diaphoresis.  "     Diagnostics     Lab Results   Labs Ordered and Resulted from Time of ED Arrival to Time of ED Departure   BASIC METABOLIC PANEL - Abnormal       Result Value    Sodium 135      Potassium 3.9      Chloride 100      Carbon Dioxide (CO2) 30 (*)     Anion Gap 5 (*)     Urea Nitrogen 8.8      Creatinine 0.64      GFR Estimate >90      Calcium 9.4      Glucose 200 (*)    CBC WITH PLATELETS AND DIFFERENTIAL - Abnormal    WBC Count 12.1 (*)     RBC Count 5.04      Hemoglobin 9.6 (*)     Hematocrit 33.4 (*)     MCV 66 (*)     MCH 19.0 (*)     MCHC 28.7 (*)     RDW 17.5 (*)     Platelet Count 385      % Neutrophils 67      % Lymphocytes 25      % Monocytes 5      % Eosinophils 2      % Basophils 0      % Immature Granulocytes 1      NRBCs per 100 WBC 0      Absolute Neutrophils 8.1      Absolute Lymphocytes 3.1      Absolute Monocytes 0.6      Absolute Eosinophils 0.2      Absolute Basophils 0.0      Absolute Immature Granulocytes 0.1      Absolute NRBCs 0.0     RBC AND PLATELET MORPHOLOGY - Abnormal    RBC Morphology Confirmed RBC Indices      Platelet Assessment        Value: Automated Count Confirmed. Platelet morphology is normal.    Elliptocytes Slight (*)      Imaging   Soft tissue neck CT w contrast   Final Result   IMPRESSION:    1.  Mild edema associated within the left buccal fat at the radiology, correlate for infection or contusion. Soft tissues are otherwise normal.        EKG   None     Independent Interpretation   None    ED Course      Medications Administered   Medications   sodium chloride (PF) 0.9% PF flush 3 mL (3 mLs Intracatheter $Given 8/25/24 0516)   sodium chloride (PF) 0.9% PF flush 3 mL (has no administration in time range)   dexAMETHasone PF (DECADRON) injection 10 mg (10 mg Intravenous $Given 8/25/24 0514)   ketorolac (TORADOL) injection 15 mg (15 mg Intravenous $Given 8/25/24 0514)   iopamidol (ISOVUE-370) solution 90 mL (90 mLs Intravenous $Given 8/25/24 0553)   Saline Flush (60 mLs  Intravenous $Given 8/25/24 0554)     Procedures   None      Discussion of Management   None    ED Course   ED Course as of 08/25/24 0705   Sun Aug 25, 2024   0623 I obtained history and examined the patient as noted above.      Additional Documentation  None    Medical Decision Making / Diagnosis     CMS Diagnoses: None    MIPS       None    MDM   Malini Stanley is a 34 year old female with pain in the left cheek area.  There is some mild swelling.  White count is mildly elevated at 12,000, CT scan was obtained and there is some inflammation in the buccal fat pad on the left.  The parotid looks okay.  I asked her if she had bitten her cheek and she thought maybe she had recently and I think she has a soft tissue infection that needs antibiotics.  She is having a little bit of trismus.  The Toradol helped a lot and her mother put her on Augmentin and a few pain pills with a soft diet and follow-up in the clinic in the next few days.  She can always return if she gets worse with swelling fevers and pain.  She is comfortable with this plan.    You can use ice or heat, ibuprofen 800 mg every 6-8 hours as needed for pain or use the Norco for more severe pain.  Soft and/or liquid diet in the next day or 2, Augmentin twice a day for 10 days.  Gradually advance your diet as you start feeling better.  If you get worse over the next 24 to 48 hours with fevers, increasing swelling and pain, return to the ER.  See your doctor in the next couple of days for a recheck.    Disposition   The patient was discharged.     Diagnosis     ICD-10-CM    1. Infection of buccal space  K13.79          Discharge Medications   Discharge Medication List as of 8/25/2024  6:34 AM        START taking these medications    Details   amoxicillin-clavulanate (AUGMENTIN) 875-125 MG tablet Take 1 tablet by mouth 2 times daily for 10 days., Disp-20 tablet, R-0, E-Prescribe      HYDROcodone-acetaminophen (NORCO) 5-325 MG tablet Take 1 tablet by  mouth every 6 hours as needed for severe pain., Disp-8 tablet, R-0, E-Prescribe           Scribe Disclosure:  I, KYLIE CARBONE, am serving as a scribe at 6:03 AM on 8/25/2024 to document services personally performed by Brittani Werner MD based on my observations and the provider's statements to me.      Brittani Werner MD  08/25/24 0706

## 2024-08-25 NOTE — DISCHARGE INSTRUCTIONS
You can use ice or heat, ibuprofen 800 mg every 6-8 hours as needed for pain or use the Norco for more severe pain.  Soft and/or liquid diet in the next day or 2, Augmentin twice a day for 10 days.  Gradually advance your diet as you start feeling better.  If you get worse over the next 24 to 48 hours with fevers, increasing swelling and pain, return to the ER.  See your doctor in the next couple of days for a recheck.